# Patient Record
Sex: FEMALE | Race: WHITE
[De-identification: names, ages, dates, MRNs, and addresses within clinical notes are randomized per-mention and may not be internally consistent; named-entity substitution may affect disease eponyms.]

---

## 2019-04-14 ENCOUNTER — HOSPITAL ENCOUNTER (EMERGENCY)
Dept: HOSPITAL 63 - ER | Age: 59
Discharge: HOME | End: 2019-04-14
Payer: COMMERCIAL

## 2019-04-14 VITALS — BODY MASS INDEX: 50.35 KG/M2 | HEIGHT: 63 IN | WEIGHT: 284.18 LBS

## 2019-04-14 VITALS — DIASTOLIC BLOOD PRESSURE: 75 MMHG | SYSTOLIC BLOOD PRESSURE: 145 MMHG

## 2019-04-14 DIAGNOSIS — F32.9: ICD-10-CM

## 2019-04-14 DIAGNOSIS — K21.9: Primary | ICD-10-CM

## 2019-04-14 DIAGNOSIS — I10: ICD-10-CM

## 2019-04-14 DIAGNOSIS — Z88.6: ICD-10-CM

## 2019-04-14 DIAGNOSIS — Z88.0: ICD-10-CM

## 2019-04-14 DIAGNOSIS — F41.9: ICD-10-CM

## 2019-04-14 DIAGNOSIS — Z88.5: ICD-10-CM

## 2019-04-14 LAB
ALBUMIN SERPL-MCNC: 3.5 G/DL (ref 3.4–5)
ALBUMIN/GLOB SERPL: 0.9 {RATIO} (ref 1–1.7)
ALP SERPL-CCNC: 65 U/L (ref 46–116)
ALT SERPL-CCNC: 21 U/L (ref 14–59)
ANION GAP SERPL CALC-SCNC: 10 MMOL/L (ref 6–14)
AST SERPL-CCNC: 17 U/L (ref 15–37)
BASOPHILS # BLD AUTO: 0.1 X10^3/UL (ref 0–0.2)
BASOPHILS NFR BLD: 1 % (ref 0–3)
BILIRUB SERPL-MCNC: 0.2 MG/DL (ref 0.2–1)
BUN/CREAT SERPL: 21 (ref 6–20)
CA-I SERPL ISE-MCNC: 21 MG/DL (ref 7–20)
CALCIUM SERPL-MCNC: 8.7 MG/DL (ref 8.5–10.1)
CHLORIDE SERPL-SCNC: 103 MMOL/L (ref 98–107)
CO2 SERPL-SCNC: 26 MMOL/L (ref 21–32)
CREAT SERPL-MCNC: 1 MG/DL (ref 0.6–1)
EOSINOPHIL NFR BLD: 0.1 X10^3/UL (ref 0–0.7)
EOSINOPHIL NFR BLD: 2 % (ref 0–3)
ERYTHROCYTE [DISTWIDTH] IN BLOOD BY AUTOMATED COUNT: 13.7 % (ref 11.5–14.5)
GFR SERPLBLD BASED ON 1.73 SQ M-ARVRAT: 56.9 ML/MIN
GLOBULIN SER-MCNC: 3.9 G/DL (ref 2.2–3.8)
GLUCOSE SERPL-MCNC: 107 MG/DL (ref 70–99)
HCT VFR BLD CALC: 36.6 % (ref 36–47)
HGB BLD-MCNC: 12.6 G/DL (ref 12–15.5)
LYMPHOCYTES # BLD: 2.6 X10^3/UL (ref 1–4.8)
LYMPHOCYTES NFR BLD AUTO: 33 % (ref 24–48)
MCH RBC QN AUTO: 30 PG (ref 25–35)
MCHC RBC AUTO-ENTMCNC: 34 G/DL (ref 31–37)
MCV RBC AUTO: 86 FL (ref 79–100)
MONO #: 0.6 X10^3/UL (ref 0–1.1)
MONOCYTES NFR BLD: 7 % (ref 0–9)
NEUT #: 4.4 X10^3UL (ref 1.8–7.7)
NEUTROPHILS NFR BLD AUTO: 57 % (ref 31–73)
PLATELET # BLD AUTO: 339 X10^3/UL (ref 140–400)
POTASSIUM SERPL-SCNC: 3.9 MMOL/L (ref 3.5–5.1)
PROT SERPL-MCNC: 7.4 G/DL (ref 6.4–8.2)
RBC # BLD AUTO: 4.25 X10^6/UL (ref 3.5–5.4)
SODIUM SERPL-SCNC: 139 MMOL/L (ref 136–145)
WBC # BLD AUTO: 7.8 X10^3/UL (ref 4–11)

## 2019-04-14 PROCEDURE — 80053 COMPREHEN METABOLIC PANEL: CPT

## 2019-04-14 PROCEDURE — 84484 ASSAY OF TROPONIN QUANT: CPT

## 2019-04-14 PROCEDURE — 93005 ELECTROCARDIOGRAM TRACING: CPT

## 2019-04-14 PROCEDURE — 71045 X-RAY EXAM CHEST 1 VIEW: CPT

## 2019-04-14 PROCEDURE — 36415 COLL VENOUS BLD VENIPUNCTURE: CPT

## 2019-04-14 PROCEDURE — 85025 COMPLETE CBC W/AUTO DIFF WBC: CPT

## 2019-04-14 NOTE — PHYS DOC
Past History


Past Medical History:  Anxiety, Depression, Hypertension


Past Surgical History:  Tubal ligation


Alcohol Use:  None


Drug Use:  None





Adult General


Chief Complaint


Chief Complaint:  CHEST PAIN





HPI


HPI


58-year-old female presents with chest pain. Patient states that the pain 

started suddenly 2 hours prior to arrival while she was crocheting. She 

describes the pain as 9 out of 10, sharp and on the left side. She is also had 

some shortness of breath and feels clammy, but has not had overt diaphoresis. 

The patient does admit that she has been getting short of breath with exertion 

the last couple of weeks. She has not had a stress test and "quite some time". 

She's never had a heart attack or cardiac catheter. Patient has had a lot of 

stress lately. She is on medications for anxiety, blood pressure, seizure 

disorder. She is not diabetic. She does not have hyperlipidemia as far she 

knows. She denies fever or chills.





Review of Systems


Review of Systems





Constitutional: Denies fever or chills []


Eyes: Denies change in visual acuity, redness, or eye pain []


HENT: Denies nasal congestion or sore throat []


Respiratory: shortness of breath []


Cardiovascular: No additional information not addressed in HPI []


GI: Denies abdominal pain, nausea, vomiting, bloody stools or diarrhea []


: Denies dysuria or hematuria []


Musculoskeletal: Denies back pain or joint pain []


Integument: Denies rash or skin lesions []


Neurologic: Denies headache, focal weakness or sensory changes []


Endocrine: Denies polyuria or polydipsia []





All other systems were reviewed and found to be within normal limits, except as 

documented in this note.





Current Medications


Current Medications





Current Medications








 Medications


  (Trade)  Dose


 Ordered  Sig/[facility]  Start Time


 Stop Time Status Last Admin


Dose Admin


 


 Aspirin


  ([facility]


 Aspirin)  324 mg  1X  ONCE  4/14/19 14:00


 4/14/19 14:01 DC  














Allergies


Allergies





Allergies








Coded Allergies Type Severity Reaction Last Updated Verified


 


  Penicillins Allergy Unknown  4/14/19 Yes


 


  acetaminophen Allergy Unknown  4/14/19 Yes


 


  bupropion Allergy Unknown  4/14/19 Yes


 


  hydrocodone Allergy Unknown  4/14/19 Yes


 


  oxycodone Allergy Unknown  4/14/19 Yes











Physical Exam


Physical Exam





Constitutional: Well developed, morbid obesity, well nourished, no acute 

distress, non-toxic appearance. []


HENT: Normocephalic, atraumatic, bilateral external ears normal, oropharynx 

moist, no oral exudates, nose normal. []


Eyes: PERRLA, EOMI, conjunctiva normal, no discharge. [] 


Neck: Normal range of motion, no tenderness, supple, no stridor. [] 


Cardiovascular:Heart rate regular rhythm, no murmur []


Lungs & Thorax:  Bilateral breath sounds clear to auscultation []


Abdomen: Bowel sounds normal, soft, no tenderness, no masses, no pulsatile 

masses. [] 


Skin: Warm, dry, no erythema, no rash. [] 


Back: No tenderness, no CVA tenderness. [] 


Extremities: No tenderness, no cyanosis, no clubbing, ROM intact, no edema. [] 


Neurologic: Alert and oriented X 3, normal motor function, normal sensory 

function, no focal deficits noted. []


Psychologic: Affect normal, judgement normal, mood anxious []





Current Patient Data


Vital Signs





 Vital Signs








  Date Time  Temp Pulse Resp B/P (MAP) Pulse Ox O2 Delivery O2 Flow Rate FiO2


 


4/14/19 13:52  71 20  99   











EKG


EKG


Sinus rhythm, rate 75, normal axis, no ST elevations or depressions.[]





Radiology/Procedures


Radiology/Procedures


[]


Impressions:


AP chest.


 


HISTORY: Chest pain, history of asthma


 


AP view was taken of the chest. Patient's taken a poor inspiration. The 


lung bases are poorly visualized especially on the left. A lateral view 


could be of benefit. There are no definite infiltrates. Heart is normal in


size without heart failure.


 


IMPRESSION:


1. Poor inspiration.


2. No definite infiltrates.


 


Electronically signed by: Eriberto Dallas MD (4/14/2019 2:19 PM) [facility]














DICTATED AND SIGNED BY:     ERIBERTO DALLAS MD


DATE:     04/14/19 1419





CC: CHRISTI GARCIA DO; PCP,NO





Course & Med Decision Making


Course & Med Decision Making


Pertinent Labs and Imaging studies reviewed. (See chart for details)


An EKG was performed within 10 minutes of arrival to the room. 324 mg of 

aspirin was given. One sublingual nitroglycerin was given to the patient which 

improved her pain to a 7. The patient's labs are unremarkable. Her EKG is 

unremarkable. Her chest x-ray is unremarkable. Her HEART score is 3. The patient

's pain has continued to decrease without further intervention to a 5 out of 

10. The patient was given a GI cocktail which helped significantly. I give the 

patient the option of admission for chest pain rule out versus going home and 

she has elected to go home. If any of her symptoms return, she will return to 

the emergency room. She is stable for discharge at this time.


[]





Dragon Disclaimer


Dragon Disclaimer


This electronic medical record was generated, in whole or in part, using a 

voice recognition dictation system.





Departure


Departure:


Impression:  


 Primary Impression:  


 Chest pain


 Additional Impression:  


 GERD (gastroesophageal reflux disease)


Disposition:  01 HOME, SELF-CARE


Condition:  STABLE


Referrals:  


PCP,NO (PCP)


Patient Instructions:  Chest Pain (Nonspecific), Easy-to-Read, Gastroesophageal 

Reflux Disease, Adult, Easy-to-Read





Problem Qualifiers








 Primary Impression:  


 Chest pain


 Chest pain type:  unspecified  Qualified Codes:  R07.9 - Chest pain, 

unspecified


 Additional Impression:  


 GERD (gastroesophageal reflux disease)


 Esophagitis presence:  without esophagitis  Qualified Codes:  K21.9 - Gastro-

esophageal reflux disease without esophagitis








CHRISTI GARCIA DO Apr 14, 2019 14:09

## 2019-04-14 NOTE — RAD
AP chest.

 

HISTORY: Chest pain, history of asthma

 

AP view was taken of the chest. Patient's taken a poor inspiration. The 

lung bases are poorly visualized especially on the left. A lateral view 

could be of benefit. There are no definite infiltrates. Heart is normal in

size without heart failure.

 

IMPRESSION:

1. Poor inspiration.

2. No definite infiltrates.

 

Electronically signed by: Dev Alejandre MD (4/14/2019 2:19 PM) [facility]

## 2019-04-15 NOTE — EKG
[facility] [address], [place] [address]

Test Date:    2019               Test Time:    13:54:34

Pat Name:     ASHLEIGH NAGEL             Department:   

Patient ID:   [id number]           Room:          

Gender:       F                        Technician:   KEV

:          1960               Requested By: CHRISTI GARCIA

Order Number: [id number]            Reading MD:   Isaac Garcia

                                 Measurements

Intervals                              Axis          

Rate:         75                       P:            53

KY:           134                      QRS:          17

QRSD:         86                       T:            63

QT:           380                                    

QTc:          427                                    

                           Interpretive Statements

SINUS RHYTHM

NORMAL ECG



Electronically Signed On 2019 12:52:38 CDT by Isaac Garcia

## 2019-05-22 ENCOUNTER — HOSPITAL ENCOUNTER (EMERGENCY)
Dept: HOSPITAL 63 - ER | Age: 59
Discharge: HOME | End: 2019-05-22
Payer: COMMERCIAL

## 2019-05-22 VITALS — HEIGHT: 63 IN | WEIGHT: 284.18 LBS | BODY MASS INDEX: 50.35 KG/M2

## 2019-05-22 VITALS
DIASTOLIC BLOOD PRESSURE: 51 MMHG | DIASTOLIC BLOOD PRESSURE: 51 MMHG | DIASTOLIC BLOOD PRESSURE: 51 MMHG | SYSTOLIC BLOOD PRESSURE: 123 MMHG | DIASTOLIC BLOOD PRESSURE: 51 MMHG | SYSTOLIC BLOOD PRESSURE: 123 MMHG | SYSTOLIC BLOOD PRESSURE: 123 MMHG | SYSTOLIC BLOOD PRESSURE: 123 MMHG | DIASTOLIC BLOOD PRESSURE: 51 MMHG | DIASTOLIC BLOOD PRESSURE: 51 MMHG | SYSTOLIC BLOOD PRESSURE: 123 MMHG | DIASTOLIC BLOOD PRESSURE: 51 MMHG | SYSTOLIC BLOOD PRESSURE: 123 MMHG | SYSTOLIC BLOOD PRESSURE: 123 MMHG

## 2019-05-22 DIAGNOSIS — K80.20: ICD-10-CM

## 2019-05-22 DIAGNOSIS — F32.9: ICD-10-CM

## 2019-05-22 DIAGNOSIS — Z88.5: ICD-10-CM

## 2019-05-22 DIAGNOSIS — I10: ICD-10-CM

## 2019-05-22 DIAGNOSIS — Z88.0: ICD-10-CM

## 2019-05-22 DIAGNOSIS — Z88.6: ICD-10-CM
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
## 2019-05-22 DIAGNOSIS — Z98.51: ICD-10-CM

## 2019-05-22 DIAGNOSIS — F41.9: ICD-10-CM

## 2019-05-22 DIAGNOSIS — R91.1: ICD-10-CM
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
## 2019-05-22 DIAGNOSIS — Z88.8: ICD-10-CM
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
## 2019-05-22 DIAGNOSIS — S30.1XXA: Primary | ICD-10-CM

## 2019-05-22 LAB
ALBUMIN SERPL-MCNC: 3.7 G/DL (ref 3.4–5)
ALP SERPL-CCNC: 59 U/L (ref 46–116)
ALT SERPL-CCNC: 28 U/L (ref 14–59)
ANION GAP SERPL CALC-SCNC: 6 MMOL/L (ref 6–14)
APTT PPP: YELLOW S
AST SERPL-CCNC: 22 U/L (ref 15–37)
BACTERIA #/AREA URNS HPF: (no result) /HPF
BASOPHILS # BLD AUTO: 0 X10^3/UL (ref 0–0.2)
BASOPHILS NFR BLD: 1 % (ref 0–3)
BILIRUB DIRECT SERPL-MCNC: 0.1 MG/DL (ref 0–0.2)
BILIRUB SERPL-MCNC: 0.3 MG/DL (ref 0.2–1)
BILIRUB UR QL STRIP: (no result)
CA-I SERPL ISE-MCNC: 17 MG/DL (ref 7–20)
CALCIUM SERPL-MCNC: 8.8 MG/DL (ref 8.5–10.1)
CHLORIDE SERPL-SCNC: 104 MMOL/L (ref 98–107)
CO2 SERPL-SCNC: 30 MMOL/L (ref 21–32)
CREAT SERPL-MCNC: 1 MG/DL (ref 0.6–1)
EOSINOPHIL NFR BLD: 0.1 X10^3/UL (ref 0–0.7)
EOSINOPHIL NFR BLD: 1 % (ref 0–3)
ERYTHROCYTE [DISTWIDTH] IN BLOOD BY AUTOMATED COUNT: 13.8 % (ref 11.5–14.5)
FIBRINOGEN PPP-MCNC: (no result) MG/DL
GFR SERPLBLD BASED ON 1.73 SQ M-ARVRAT: 56.7 ML/MIN
GLUCOSE SERPL-MCNC: 103 MG/DL (ref 70–99)
GLUCOSE UR STRIP-MCNC: (no result) MG/DL
HCT VFR BLD CALC: 36.8 % (ref 36–47)
HGB BLD-MCNC: 12.4 G/DL (ref 12–15.5)
LYMPHOCYTES # BLD: 2 X10^3/UL (ref 1–4.8)
LYMPHOCYTES NFR BLD AUTO: 24 % (ref 24–48)
MCH RBC QN AUTO: 29 PG (ref 25–35)
MCHC RBC AUTO-ENTMCNC: 34 G/DL (ref 31–37)
MCV RBC AUTO: 87 FL (ref 79–100)
MONO #: 0.5 X10^3/UL (ref 0–1.1)
MONOCYTES NFR BLD: 6 % (ref 0–9)
NEUT #: 5.8 X10^3UL (ref 1.8–7.7)
NEUTROPHILS NFR BLD AUTO: 69 % (ref 31–73)
NITRITE UR QL STRIP: (no result)
PLATELET # BLD AUTO: 329 X10^3/UL (ref 140–400)
POTASSIUM SERPL-SCNC: 4.2 MMOL/L (ref 3.5–5.1)
PROT SERPL-MCNC: 7 G/DL (ref 6.4–8.2)
RBC # BLD AUTO: 4.25 X10^6/UL (ref 3.5–5.4)
RBC #/AREA URNS HPF: (no result) /HPF (ref 0–2)
SODIUM SERPL-SCNC: 140 MMOL/L (ref 136–145)
SP GR UR STRIP: <=1.005
SQUAMOUS #/AREA URNS LPF: (no result) /LPF
UROBILINOGEN UR-MCNC: 0.2 MG/DL
WBC # BLD AUTO: 8.5 X10^3/UL (ref 4–11)
WBC #/AREA URNS HPF: (no result) /HPF (ref 0–4)

## 2019-05-22 PROCEDURE — 81001 URINALYSIS AUTO W/SCOPE: CPT

## 2019-05-22 PROCEDURE — 96374 THER/PROPH/DIAG INJ IV PUSH: CPT

## 2019-05-22 PROCEDURE — 99285 EMERGENCY DEPT VISIT HI MDM: CPT

## 2019-05-22 PROCEDURE — 80048 BASIC METABOLIC PNL TOTAL CA: CPT

## 2019-05-22 PROCEDURE — 85025 COMPLETE CBC W/AUTO DIFF WBC: CPT

## 2019-05-22 PROCEDURE — 74177 CT ABD & PELVIS W/CONTRAST: CPT

## 2019-05-22 PROCEDURE — 36415 COLL VENOUS BLD VENIPUNCTURE: CPT

## 2019-05-22 PROCEDURE — C9113 INJ PANTOPRAZOLE SODIUM, VIA: HCPCS

## 2019-05-22 PROCEDURE — 80076 HEPATIC FUNCTION PANEL: CPT

## 2019-05-22 PROCEDURE — 96375 TX/PRO/DX INJ NEW DRUG ADDON: CPT

## 2019-05-22 PROCEDURE — 71260 CT THORAX DX C+: CPT
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
## 2019-05-31 ENCOUNTER — HOSPITAL ENCOUNTER (OUTPATIENT)
Dept: HOSPITAL 63 - NM | Age: 59
Discharge: HOME | End: 2019-05-31
Attending: NURSE PRACTITIONER
Payer: COMMERCIAL

## 2019-05-31 VITALS — HEIGHT: 63 IN | WEIGHT: 280 LBS | BODY MASS INDEX: 49.61 KG/M2

## 2019-05-31 DIAGNOSIS — R11.0: ICD-10-CM

## 2019-05-31 DIAGNOSIS — R10.11: Primary | ICD-10-CM

## 2019-05-31 PROCEDURE — 78227 HEPATOBIL SYST IMAGE W/DRUG: CPT

## 2019-05-31 PROCEDURE — A9537 TC99M MEBROFENIN: HCPCS

## 2019-05-31 NOTE — RAD
Radionuclide hepatobiliary scan with gallbladder ejection fraction, 

5/31/2019:

 

HISTORY: Right upper quadrant pain and nausea

 

Following IV injection of 5.5 mCi of technetium 99m Choletec there was 

prompt uptake of the radionuclide from the blood stream by the liver. 

Activity is present in the bile ducts at 15 minutes and in the small bowel

at 20 minutes. Gallbladder activity developed at 25 minutes. Additional 

imaging of the gallbladder was then performed following IV injection of 

2.5 mcg of cholecystokinin. The gallbladder ejection fraction is low, 

measured at 8 percent.

 

IMPRESSION:

1. No evidence of cystic duct or common bile duct obstruction.

2. Low gallbladder ejection fraction of 8 percent.

 

Electronically signed by: Rick Moritz, MD (5/31/2019 1:24 PM) [facility]

## 2019-06-14 ENCOUNTER — HOSPITAL ENCOUNTER (OUTPATIENT)
Dept: HOSPITAL 61 - SURG | Age: 59
Discharge: HOME | End: 2019-06-14
Attending: SURGERY
Payer: COMMERCIAL

## 2019-06-14 VITALS — WEIGHT: 259.93 LBS | BODY MASS INDEX: 46.05 KG/M2 | HEIGHT: 63 IN

## 2019-06-14 VITALS — SYSTOLIC BLOOD PRESSURE: 120 MMHG | DIASTOLIC BLOOD PRESSURE: 68 MMHG

## 2019-06-14 DIAGNOSIS — K82.8: ICD-10-CM

## 2019-06-14 DIAGNOSIS — R59.1: ICD-10-CM

## 2019-06-14 DIAGNOSIS — K80.10: Primary | ICD-10-CM

## 2019-06-14 DIAGNOSIS — Z88.8: ICD-10-CM

## 2019-06-14 DIAGNOSIS — Z88.5: ICD-10-CM

## 2019-06-14 DIAGNOSIS — Z88.0: ICD-10-CM

## 2019-06-14 DIAGNOSIS — Z96.652: ICD-10-CM

## 2019-06-14 PROCEDURE — 88304 TISSUE EXAM BY PATHOLOGIST: CPT

## 2019-06-14 PROCEDURE — A7015 AEROSOL MASK USED W NEBULIZE: HCPCS

## 2019-06-14 PROCEDURE — 47562 LAPAROSCOPIC CHOLECYSTECTOMY: CPT

## 2019-06-14 RX ADMIN — FENTANYL CITRATE PRN MCG: 50 INJECTION INTRAMUSCULAR; INTRAVENOUS at 10:06

## 2019-06-14 RX ADMIN — FENTANYL CITRATE PRN MCG: 50 INJECTION INTRAMUSCULAR; INTRAVENOUS at 10:19

## 2019-06-14 NOTE — DISCH
DISCHARGE INSTRUCTIONS


Condition on Discharge


Condition on Discharge:  Stable





Activity After Discharge


Activity Instructions for Disc:  Avoid exertion


Other activity instructions:  No lifting >20lbs for 2 weeks





Diet after Discharge


Diet after Discharge:  Low Fat





Wound Incision Care


Other wound/incision instructi:  May shower in 24 hours





Contacting the  after DC


Call your doctor for:  If your condition worsens





Follow-Up


Follow up with:  Dr Nicole in 2 weeks











SAE NICOLE MD             Jun 14, 2019 09:12

## 2019-06-14 NOTE — PDOC4
Operative Note


Operative Note


Date: 6/14/2019


Preoperative diagnosis: Chronic cholecystitis cholelithiasis


Postoperative diagnosis: Same


Procedure: Robotic-assisted laparoscopic cholecystectomy


Surgeon: Nazario


Specimen: Gallbladder


Dictation: Patient is a 59-year-old morbidly obese female who's had right upper 

quadrant abdominal pain and nausea vomiting or last month ultrasound showed 

gallstones. Procedure of robotic-assisted laparoscopic cholecystectomy was 

explained to the patient in detail risk benefits were also discussed including 

bleeding infection injury to intra-abdominal contents possibly necessitating 

further or open operations alternatives to this procedure also discussed with 

the patient who seemed to understand and gave both verbal and written consent to

have the procedure performed. Patient was taken to the operating room placed in 

supine position general anesthesia was initiated once patient was sleep and 

intubated her abdomen was prepped and draped usual sterile fashion using 

ChloraPrep and area at the umbilicus is injected quarter percent Marcaine with 

epinephrine incision was made Lembert scalpel Veress needle was placed within 

the abdomen creating pneumoperitoneum once this complete a long 12 mm port was 

placed and a 5 mm camera was placed within the abdomen which was inspected no 

other abdomen maladies are noted a 5 mm port was placed in the left upper 

quadrant and the da Omid bariatric ports were placed in the right mid abdomen 

and one in the left mid abdomen. This point the robotic was brought in and 

docked all the port sites and the surgeon went to the robotic console using 

regular laparoscopic grasper through the left upper quadrant port the dome of 

the gallbladder is grasped and retracted cephalad program grasper is used to 

grasp the infundibular gallbladder retracted laterally exposing the triangle 

adherent tissues the triangle were taken down with a collect cautery exposing 

the cystic duct which was doubly clipped with hemoclips and transected with Endo

Marta scissors the cystic artery was then visualized and clipped with a hemo-

lock clip and transected with the hook electrocautery the gallbladder was taken 

off the liver with hook electrocautery. At this point surgeon returned to the 

operative field the gallbladder was placed within Endo Catch bag and removed 

from the umbilicus the right upper quadrant was irrigated suctioned dry 

hemostasis deemed to be appropriate and the pneumoperitoneum was reduced the 

robot was undocked from all ports all ports removed the fascial defect at the 

umbilicus closed figure-of-eight 0 Vicryl suture and skin was approximate all 

port sites for septic and a Monocryl Mastisol Steri-Strips and island dressings 

were applied. The patient was awakened and a bated operating room taken to 

recovery in stable condition all sponge instrument needle counts listed as 

correct estimated blood loss 10 mL.











SAE NICOLE MD             Jun 14, 2019 09:17

## 2019-06-17 NOTE — PATHOLOGY
[facility] Accession Number: [id number]

.                                                                01

Material submitted:                                        .

gallbladder - GALLBLADDER

.                                                                01

Clinical history:                                          .

Biliary dyskinesia

.                                                                02

**********************************************************************

Diagnosis:

Gallbladder, cholecystectomy:

- Chronic cholecystitis.

- Cholesterolosis.

- Cholelithiasis.

- Cystic duct lymph node with mild fatty change.

(SKM/db; 6/17/2019)

LBQ/06/17/2019

**********************************************************************

.                                                                02

Electronically signed:                                     .

Alvaro Pollard MD, Pathologist

NPI- [id number]

.                                                                01

Gross description:                                         .

The specimen is received in formalin labeled "Manriquez, Susy, gallbladder"

and consists of an intact pink-green and wrinkled gallbladder measuring

8.5 cm in length and up to 3.5 cm in diameter.  The margin is inked black.

 Opening reveals a lumen filled with tenacious green bile and a single

oval smooth green-yellow calculus measuring 3.8 x 2.4 cm.  The mucosa is

green-brown with yellow stippling and an average wall thickness of 0.1 cm.

 No masses are identified.  Adjacent the gallbladder neck is a lymph node

candidate measuring 1.8 x 0.6 cm.  Representative sections are submitted

in A1-A2.

(SDY; 6/14/2019)

SYU/SYU

.                                                                02

Pathologist provided ICD-10:

K80.10, K82.4

.                                                                02

CPT                                                        .

402590

Specimen Comment: A courtesy copy of this report has been sent to

Specimen Comment: [phone].

Specimen Comment: Report sent to 

Specimen Comment: A duplicate report has been generated due to demographic update

Specimen Comment: of the patient's Date of Birth, Age, Gender, and/or Specimen Date.

Specimen Comment: Please review patient results, reference intervals, and calculated

Specimen Comment: results that may have been affected by this change.

***Performed at:  01

   [facility]

   [address], [place]  [id number]

   MD Ritesh Powers MD Phone:  [phone]

***Performed at:  02

   [facility]

   [address], [place]  [id number]

   MD Kalia Hassan MD Phone:  [phone]

## 2021-01-14 ENCOUNTER — HOSPITAL ENCOUNTER (OUTPATIENT)
Dept: HOSPITAL 63 - LAB | Age: 61
End: 2021-01-14
Payer: COMMERCIAL

## 2021-01-14 DIAGNOSIS — I10: Primary | ICD-10-CM

## 2021-01-14 LAB
ANION GAP SERPL CALC-SCNC: 11 MMOL/L (ref 6–14)
CA-I SERPL ISE-MCNC: 10 MG/DL (ref 7–20)
CALCIUM SERPL-MCNC: 8.7 MG/DL (ref 8.5–10.1)
CHLORIDE SERPL-SCNC: 101 MMOL/L (ref 98–107)
CHOLEST/HDLC SERPL: 3 {RATIO}
CO2 SERPL-SCNC: 25 MMOL/L (ref 21–32)
CREAT SERPL-MCNC: 0.7 MG/DL (ref 0.6–1)
GFR SERPLBLD BASED ON 1.73 SQ M-ARVRAT: 85.4 ML/MIN
GLUCOSE SERPL-MCNC: 99 MG/DL (ref 70–99)
HDLC SERPL-MCNC: 53 MG/DL (ref 40–60)
LDLC: 108 MG/DL (ref 0–100)
POTASSIUM SERPL-SCNC: 3.7 MMOL/L (ref 3.5–5.1)
SODIUM SERPL-SCNC: 137 MMOL/L (ref 136–145)
TRIGL SERPL-MCNC: 109 MG/DL (ref 0–150)
VLDLC: 21 MG/DL (ref 0–40)

## 2021-01-14 PROCEDURE — 80048 BASIC METABOLIC PNL TOTAL CA: CPT

## 2021-01-14 PROCEDURE — 36415 COLL VENOUS BLD VENIPUNCTURE: CPT

## 2021-01-14 PROCEDURE — 80061 LIPID PANEL: CPT

## 2021-02-15 ENCOUNTER — HOSPITAL ENCOUNTER (OUTPATIENT)
Dept: HOSPITAL 63 - ECHO | Age: 61
End: 2021-02-15
Payer: COMMERCIAL

## 2021-02-15 DIAGNOSIS — R06.00: Primary | ICD-10-CM

## 2021-02-15 PROCEDURE — 93306 TTE W/DOPPLER COMPLETE: CPT

## 2021-02-15 NOTE — CARD
MR#: [id number]

Account#: [id number]

Accession#: [id number]

Date of Study: 02/15/2021

Ordering Physician: SUSANA SHERMAN, 

Referring Physician: SUSANA SHERMAN, 

Tech: Nae Chapman, [facility]





--------------- APPROVED REPORT --------------





EXAM: Two-dimensional and M-mode echocardiogram with Doppler and color Doppler.



Other Information 

Quality : AverageHR: 74bpm



INDICATION

Dyspnea 



RISK FACTORS

Hypertension 

Asthma



2D DIMENSIONS 

RVDd2.8 (2.9-3.5cm)Left Atrium(2D)3.4 (1.6-4.0cm)

IVSd1.0 (0.7-1.1cm)Aortic Root(2D)3.1 (2.0-3.7cm)

LVDd5.2 (3.9-5.9cm)LVOT Diameter2.0 (1.8-2.4cm)

PWd1.0 (0.7-1.1cm)LVDs3.4 (2.5-4.0cm)

FS (%) 34.0 %SV80.6 ml



Aortic Valve

AoV Peak Roger.152.6cm/sAoV VTI29.1cm

AO Peak GR.9.3mmHgLVOT Peak Roger.162.1cm/s

LVOT  VTI 33.47cmAO Mean GR.5mmHg

DONOVAN (VMAX)3.31ms0COC   (VTI)3.64cm2



Mitral Valve

MV E Jisavmrv30.1cm/sMV DECEL [id number]

MV A Cjukygzx28.6cm/sE/A  Ratio1.1



Pulmonary Valve

PV Peak Ijodcutv768.9cm/sPV Peak Grad.5mmHg



Tricuspid Valve

TR P. Jvjywjoh873lx/sRAP IFHOXPLK6rkLs

TR Peak Gr.38ivXoSPMN25sxNd



Pulmonary Vein

S1 Oxjdjlyl46.4cm/sD2 Navvbqbh42.9cm/s



 LEFT VENTRICLE 

The left ventricle is normal size. There is normal left ventricular wall thickness. The left ventricu
lar systolic function is normal and the ejection fraction is within normal range. The Ejection Fracti
on is 55-60%. There is normal LV segmental wall motion. Transmitral Doppler flow pattern is Grade II-
pseudonormal filling dynamics.



 RIGHT VENTRICLE 

The right ventricle is normal size. There is normal right ventricular wall thickness. The right ventr
icular systolic function is normal.



 ATRIA 

The left atrium size is normal. The right atrium size is normal. The interatrial septum is intact wit
h no evidence for an atrial septal defect or patent foramen ovale as noted on 2-D or Doppler imaging.




 AORTIC VALVE 

The aortic valve is normal in structure and function. Doppler and Color Flow revealed no significant 
aortic regurgitation. There is no significant aortic valvular stenosis. Calculated aortic valve area 
is 3.9 cm2 with maximum pressure gradient of 10 mmHg and mean pressure gradient of 5 mmHg.



 MITRAL VALVE 

The mitral valve is normal in structure and function. There is no evidence of mitral valve prolapse. 
There is no mitral valve stenosis. Doppler and Color-flow revealed trace mitral regurgitation.



 TRICUSPID VALVE 

The tricuspid valve is normal in structure and function. Doppler and Color Flow revealed trace tricus
pid regurgitation with an estimated PAP of 32 mmHg. There is no tricuspid valve stenosis.



 PULMONIC VALVE 

The pulmonic valve is not well visualized. Doppler and Color Flow revealed trace pulmonic valvular re
gurgitation.



 GREAT VESSELS 

The aortic root is normal in size. The IVC was not visualized.



 PERICARDIAL EFFUSION 

There is no evidence of significant pericardial effusion.



Critical Notification

Critical Value: No



<Conclusion>

The left ventricle is normal size.

The left ventricular systolic function is normal and the ejection fraction is within normal range.

The Ejection Fraction is 55-60%.

Doppler and Color Flow revealed no significant aortic regurgitation.

There is no significant aortic valvular stenosis.

Doppler and Color-flow revealed trace mitral regurgitation.

Doppler and Color Flow revealed trace tricuspid regurgitation with an estimated PAP of 32 mmHg.



Signed by : Willie Mejias MD

Electronically Approved : 02/15/2021 17:03:26

## 2021-09-24 ENCOUNTER — HOSPITAL ENCOUNTER (OUTPATIENT)
Dept: HOSPITAL 63 - LAB | Age: 61
End: 2021-09-24
Payer: COMMERCIAL

## 2021-09-24 DIAGNOSIS — I10: Primary | ICD-10-CM

## 2021-09-24 LAB
ALBUMIN SERPL-MCNC: 3.5 G/DL (ref 3.4–5)
ALBUMIN/GLOB SERPL: 0.9 {RATIO} (ref 1–1.7)
ALP SERPL-CCNC: 70 U/L (ref 46–116)
ALT SERPL-CCNC: 32 U/L (ref 14–59)
ANION GAP SERPL CALC-SCNC: 9 MMOL/L (ref 6–14)
AST SERPL-CCNC: 18 U/L (ref 15–37)
BILIRUB SERPL-MCNC: 0.4 MG/DL (ref 0.2–1)
BUN/CREAT SERPL: 14 (ref 6–20)
CA-I SERPL ISE-MCNC: 11 MG/DL (ref 7–20)
CALCIUM SERPL-MCNC: 8.9 MG/DL (ref 8.5–10.1)
CHLORIDE SERPL-SCNC: 103 MMOL/L (ref 98–107)
CHOLEST/HDLC SERPL: 3 {RATIO}
CO2 SERPL-SCNC: 28 MMOL/L (ref 21–32)
CREAT SERPL-MCNC: 0.8 MG/DL (ref 0.6–1)
GFR SERPLBLD BASED ON 1.73 SQ M-ARVRAT: 72.9 ML/MIN
GLOBULIN SER-MCNC: 3.8 G/DL (ref 2.2–3.8)
GLUCOSE SERPL-MCNC: 121 MG/DL (ref 70–99)
HDLC SERPL-MCNC: 43 MG/DL (ref 40–60)
LDLC: 89 MG/DL (ref 0–100)
POTASSIUM SERPL-SCNC: 3.7 MMOL/L (ref 3.5–5.1)
PROT SERPL-MCNC: 7.3 G/DL (ref 6.4–8.2)
SODIUM SERPL-SCNC: 140 MMOL/L (ref 136–145)
TRIGL SERPL-MCNC: 134 MG/DL (ref 0–150)
VLDLC: 26 MG/DL (ref 0–40)

## 2021-09-24 PROCEDURE — 80053 COMPREHEN METABOLIC PANEL: CPT

## 2021-09-24 PROCEDURE — 80061 LIPID PANEL: CPT

## 2021-09-24 PROCEDURE — 36415 COLL VENOUS BLD VENIPUNCTURE: CPT

## 2022-04-07 ENCOUNTER — HOSPITAL ENCOUNTER (OUTPATIENT)
Dept: HOSPITAL 63 - CT | Age: 62
End: 2022-04-07
Attending: FAMILY MEDICINE
Payer: COMMERCIAL

## 2022-04-07 DIAGNOSIS — I25.10: ICD-10-CM

## 2022-04-07 DIAGNOSIS — M47.814: ICD-10-CM

## 2022-04-07 DIAGNOSIS — K76.0: ICD-10-CM

## 2022-04-07 DIAGNOSIS — R91.1: Primary | ICD-10-CM

## 2022-04-07 PROCEDURE — 71250 CT THORAX DX C-: CPT

## 2022-04-07 NOTE — RAD
EXAMINATION: CT Chest Without IV contrast.



INDICATION:61 years, Female, pulmonary nodule. Follow-up exam.



COMPARISON: 5/22/2019. 



TECHNIQUE: Spiral CT was obtained from the jugular notch through the posterior costophrenic recess. S
agittal and coronal reformats were obtained.



Exposure: One or more of the following individualized dose reduction techniques were utilized for thi
s examination:  

1. Automated exposure control  

2. Adjustment of the mA and/or kV according to patient size  

3. Use of iterative reconstruction technique.



FINDINGS:

LUNGS/PLEURA: Central airways are patent. No focal consolidation, pleural effusion or pneumothorax. F
ew scattered bilateral solid pulmonary nodules are unchanged in size since May 2019 and most likely b
enign. For example 0.5 cm fissure based nodule in the anterior left lower lobe (series 2 image 182). 
0.4 cm fissure based nodule in the anterior right lower lobe (series 2 image 129). No new or enlarged
 pulmonary nodule.



MEDIASTINUM: No pathologic mediastinal or hilar adenopathy. The thoracic aorta enhances normal in wandy
iber. Enlarged pulmonary trunk measures up to 3.4 cm in diameter., findings can be seen in pulmonary 
arterial. The heart is normal in size. No pericardial effusion. Severe calcified coronary atheroscler
osis. The visualized thyroid and the esophagus are unremarkable.



AXILLA/SOFT TISSUE: No supraclavicular or axillary adenopathy. Regional soft tissues are within jaqueline
l limits.



UPPER ABDOMEN: Hepatomegaly with diffuse steatosis.



BONES: No evidence of acute fractures or aggressive osseous lesions. Multilevel degenerative changes 
in the spine.



IMPRESSION:

1.  Few scattered bilateral solid pulmonary nodules are unchanged in size since May 2019 and most lik
ely benign. 

2.  Enlarged pulmonary trunk, which can be seen with pulmonary artery hypertension.

3.  Severe calcified coronary atherosclerosis.

4.  Hepatomegaly with diffuse steatosis.



Electronically signed by: Dianne Singh MD (4/7/2022 11:27 AM) [facility]

## 2022-04-14 ENCOUNTER — HOSPITAL ENCOUNTER (OUTPATIENT)
Dept: HOSPITAL 63 - RAD | Age: 62
End: 2022-04-14
Payer: COMMERCIAL

## 2022-04-14 DIAGNOSIS — Z96.652: ICD-10-CM

## 2022-04-14 DIAGNOSIS — M25.862: Primary | ICD-10-CM

## 2022-04-14 DIAGNOSIS — M25.532: ICD-10-CM

## 2022-04-14 PROCEDURE — 73110 X-RAY EXAM OF WRIST: CPT

## 2022-04-14 PROCEDURE — 73560 X-RAY EXAM OF KNEE 1 OR 2: CPT

## 2022-04-14 PROCEDURE — 73565 X-RAY EXAM OF KNEES: CPT

## 2022-04-14 NOTE — RAD
EXAM:  XR KNEE_AP BILAT STANDING, XR LT WRIST 3VIEWS, XR KNEE_LT 1-2 VIEWS 4/14/2022 1:27 PM



CLINICAL INDICATION:  Chronic pain wrist and knee pain, no injury



COMPARISON:  None



TECHNIQUE:  Standing AP view of the bilateral knees. Lateral and sunrise view of the left knee. PA, o
blique, and lateral views of the left wrist.



FINDINGS:  



Left knee: There is a left total knee prosthesis. No periprosthetic lucency or fracture. No joint eff
usion. Mild heterotopic ossification in the distal quadriceps tendon and at the medial and lateral as
pect of the knee.There is a right total knee prosthesis.



Left wrist: No acute fracture. Alignment is normal. Joint spaces are maintained. There is no soft tis
bernardo abnormality.



IMPRESSION:  

1. Uncomplicated left total knee prosthesis.

2. No acute osseous abnormality of the left wrist.



Electronically signed by: Nancy Fernandez MD (4/14/2022 3:32 PM) [id number]

## 2022-04-14 NOTE — RAD
EXAM:  XR KNEE_AP BILAT STANDING, XR LT WRIST 3VIEWS, XR KNEE_LT 1-2 VIEWS 4/14/2022 1:27 PM



CLINICAL INDICATION:  Chronic pain wrist and knee pain, no injury



COMPARISON:  None



TECHNIQUE:  Standing AP view of the bilateral knees. Lateral and sunrise view of the left knee. PA, o
blique, and lateral views of the left wrist.



FINDINGS:  



Left knee: There is a left total knee prosthesis. No periprosthetic lucency or fracture. No joint eff
usion. Mild heterotopic ossification in the distal quadriceps tendon and at the medial and lateral as
pect of the knee.There is a right total knee prosthesis.



Left wrist: No acute fracture. Alignment is normal. Joint spaces are maintained. There is no soft tis
bernardo abnormality.



IMPRESSION:  

1. Uncomplicated left total knee prosthesis.

2. No acute osseous abnormality of the left wrist.



Electronically signed by: Nancy Fernandez MD (4/14/2022 3:32 PM) [id number]

## 2022-04-14 NOTE — RAD
EXAM:  XR KNEE_AP BILAT STANDING, XR LT WRIST 3VIEWS, XR KNEE_LT 1-2 VIEWS 4/14/2022 1:27 PM



CLINICAL INDICATION:  Chronic pain wrist and knee pain, no injury



COMPARISON:  None



TECHNIQUE:  Standing AP view of the bilateral knees. Lateral and sunrise view of the left knee. PA, o
blique, and lateral views of the left wrist.



FINDINGS:  



Left knee: There is a left total knee prosthesis. No periprosthetic lucency or fracture. No joint eff
usion. Mild heterotopic ossification in the distal quadriceps tendon and at the medial and lateral as
pect of the knee.There is a right total knee prosthesis.



Left wrist: No acute fracture. Alignment is normal. Joint spaces are maintained. There is no soft tis
bernardo abnormality.



IMPRESSION:  

1. Uncomplicated left total knee prosthesis.

2. No acute osseous abnormality of the left wrist.



Electronically signed by: Nancy Fernandez MD (4/14/2022 3:32 PM) [id number]